# Patient Record
Sex: MALE | Race: WHITE | Employment: FULL TIME | URBAN - METROPOLITAN AREA
[De-identification: names, ages, dates, MRNs, and addresses within clinical notes are randomized per-mention and may not be internally consistent; named-entity substitution may affect disease eponyms.]

---

## 2021-10-15 ENCOUNTER — APPOINTMENT (OUTPATIENT)
Dept: GENERAL RADIOLOGY | Facility: CLINIC | Age: 31
End: 2021-10-15
Attending: FAMILY MEDICINE
Payer: COMMERCIAL

## 2021-10-15 ENCOUNTER — HOSPITAL ENCOUNTER (EMERGENCY)
Facility: CLINIC | Age: 31
Discharge: HOME | End: 2021-10-15
Attending: FAMILY MEDICINE | Admitting: FAMILY MEDICINE
Payer: COMMERCIAL

## 2021-10-15 ENCOUNTER — NURSE TRIAGE (OUTPATIENT)
Dept: NURSING | Facility: CLINIC | Age: 31
End: 2021-10-15

## 2021-10-15 VITALS
RESPIRATION RATE: 24 BRPM | WEIGHT: 250 LBS | OXYGEN SATURATION: 93 % | HEART RATE: 96 BPM | HEIGHT: 71 IN | SYSTOLIC BLOOD PRESSURE: 140 MMHG | TEMPERATURE: 100.2 F | DIASTOLIC BLOOD PRESSURE: 73 MMHG | BODY MASS INDEX: 35 KG/M2

## 2021-10-15 DIAGNOSIS — U07.1 INFECTION DUE TO 2019 NOVEL CORONAVIRUS: ICD-10-CM

## 2021-10-15 LAB
ALBUMIN SERPL-MCNC: 3.6 G/DL (ref 3.4–5)
ALP SERPL-CCNC: 56 U/L (ref 40–150)
ALT SERPL W P-5'-P-CCNC: 64 U/L (ref 0–70)
ANION GAP SERPL CALCULATED.3IONS-SCNC: 7 MMOL/L (ref 3–14)
AST SERPL W P-5'-P-CCNC: 34 U/L (ref 0–45)
BASOPHILS # BLD AUTO: 0 10E3/UL (ref 0–0.2)
BASOPHILS NFR BLD AUTO: 0 %
BILIRUB SERPL-MCNC: 0.3 MG/DL (ref 0.2–1.3)
BUN SERPL-MCNC: 13 MG/DL (ref 7–30)
CALCIUM SERPL-MCNC: 8.3 MG/DL (ref 8.5–10.1)
CHLORIDE BLD-SCNC: 103 MMOL/L (ref 94–109)
CO2 SERPL-SCNC: 23 MMOL/L (ref 20–32)
CREAT SERPL-MCNC: 0.91 MG/DL (ref 0.66–1.25)
D DIMER PPP FEU-MCNC: <0.27 UG/ML FEU (ref 0–0.5)
EOSINOPHIL # BLD AUTO: 0 10E3/UL (ref 0–0.7)
EOSINOPHIL NFR BLD AUTO: 0 %
ERYTHROCYTE [DISTWIDTH] IN BLOOD BY AUTOMATED COUNT: 11.9 % (ref 10–15)
GFR SERPL CREATININE-BSD FRML MDRD: >90 ML/MIN/1.73M2
GLUCOSE BLD-MCNC: 104 MG/DL (ref 70–99)
HCT VFR BLD AUTO: 46.5 % (ref 40–53)
HGB BLD-MCNC: 15.8 G/DL (ref 13.3–17.7)
HOLD SPECIMEN: NORMAL
HOLD SPECIMEN: NORMAL
IMM GRANULOCYTES # BLD: 0 10E3/UL
IMM GRANULOCYTES NFR BLD: 0 %
LYMPHOCYTES # BLD AUTO: 0.8 10E3/UL (ref 0.8–5.3)
LYMPHOCYTES NFR BLD AUTO: 21 %
MCH RBC QN AUTO: 30.2 PG (ref 26.5–33)
MCHC RBC AUTO-ENTMCNC: 34 G/DL (ref 31.5–36.5)
MCV RBC AUTO: 89 FL (ref 78–100)
MONOCYTES # BLD AUTO: 0.2 10E3/UL (ref 0–1.3)
MONOCYTES NFR BLD AUTO: 6 %
NEUTROPHILS # BLD AUTO: 2.8 10E3/UL (ref 1.6–8.3)
NEUTROPHILS NFR BLD AUTO: 73 %
NRBC # BLD AUTO: 0 10E3/UL
NRBC BLD AUTO-RTO: 0 /100
PLATELET # BLD AUTO: 151 10E3/UL (ref 150–450)
POTASSIUM BLD-SCNC: 4.3 MMOL/L (ref 3.4–5.3)
PROT SERPL-MCNC: 8 G/DL (ref 6.8–8.8)
RBC # BLD AUTO: 5.24 10E6/UL (ref 4.4–5.9)
SODIUM SERPL-SCNC: 133 MMOL/L (ref 133–144)
TROPONIN I SERPL-MCNC: <0.015 UG/L (ref 0–0.04)
WBC # BLD AUTO: 3.9 10E3/UL (ref 4–11)

## 2021-10-15 PROCEDURE — 36415 COLL VENOUS BLD VENIPUNCTURE: CPT | Performed by: FAMILY MEDICINE

## 2021-10-15 PROCEDURE — 99285 EMERGENCY DEPT VISIT HI MDM: CPT | Mod: 25 | Performed by: FAMILY MEDICINE

## 2021-10-15 PROCEDURE — 71045 X-RAY EXAM CHEST 1 VIEW: CPT

## 2021-10-15 PROCEDURE — 258N000003 HC RX IP 258 OP 636: Performed by: FAMILY MEDICINE

## 2021-10-15 PROCEDURE — 84484 ASSAY OF TROPONIN QUANT: CPT | Performed by: FAMILY MEDICINE

## 2021-10-15 PROCEDURE — 96361 HYDRATE IV INFUSION ADD-ON: CPT | Performed by: FAMILY MEDICINE

## 2021-10-15 PROCEDURE — 250N000011 HC RX IP 250 OP 636: Performed by: FAMILY MEDICINE

## 2021-10-15 PROCEDURE — 93005 ELECTROCARDIOGRAM TRACING: CPT | Performed by: FAMILY MEDICINE

## 2021-10-15 PROCEDURE — 85379 FIBRIN DEGRADATION QUANT: CPT | Performed by: FAMILY MEDICINE

## 2021-10-15 PROCEDURE — 93010 ELECTROCARDIOGRAM REPORT: CPT | Performed by: FAMILY MEDICINE

## 2021-10-15 PROCEDURE — 82040 ASSAY OF SERUM ALBUMIN: CPT | Performed by: FAMILY MEDICINE

## 2021-10-15 PROCEDURE — 96374 THER/PROPH/DIAG INJ IV PUSH: CPT | Performed by: FAMILY MEDICINE

## 2021-10-15 PROCEDURE — 85025 COMPLETE CBC W/AUTO DIFF WBC: CPT | Performed by: FAMILY MEDICINE

## 2021-10-15 RX ORDER — KETOROLAC TROMETHAMINE 15 MG/ML
15 INJECTION, SOLUTION INTRAMUSCULAR; INTRAVENOUS ONCE
Status: COMPLETED | OUTPATIENT
Start: 2021-10-15 | End: 2021-10-15

## 2021-10-15 RX ADMIN — KETOROLAC TROMETHAMINE 15 MG: 15 INJECTION, SOLUTION INTRAMUSCULAR; INTRAVENOUS at 16:05

## 2021-10-15 RX ADMIN — SODIUM CHLORIDE, POTASSIUM CHLORIDE, SODIUM LACTATE AND CALCIUM CHLORIDE 1000 ML: 600; 310; 30; 20 INJECTION, SOLUTION INTRAVENOUS at 16:05

## 2021-10-15 ASSESSMENT — MIFFLIN-ST. JEOR: SCORE: 2111.12

## 2021-10-15 NOTE — DISCHARGE INSTRUCTIONS
You may use ibuprofen 400 to 600 mg 4 times per day if needed for her discomfort.  You may add acetaminophen 1000 mg 4 times per day if needed.  Monitor your oxygenation and return to be seen if your O2 sats are consistently below 90%.,  And also if you are not able to manage pain or not able to take fluids.  If you are not taking food, you need to drink more than just water.  Sport drinks and chicken broth will help.  See additional discharge instructions attached.

## 2021-10-15 NOTE — TELEPHONE ENCOUNTER
RN triage   Call from pt   Pt states he had positive covid test 10/11--   Fevers initially 104 x 3 days --- now vary == 99 - 102--- 102 today   Cough -- and dif breathing -- even at rest --   Nausea and vomiting -- and vomiting w/ coughing -- x1 today   Not sleeping   Feeling very fatigued     Per protocol = should go to ED   Pt states he will go to [place] [facility]     Lucy Walker RN  [facility]  Triage Nurse Advisor    COVID 19 Nurse Triage Plan/Patient Instructions    Please be aware that novel coronavirus (COVID-19) may be circulating in the community. If you develop symptoms such as fever, cough, or SOB or if you have concerns about the presence of another infection including coronavirus (COVID-19), please contact your health care provider or visit [email or web address].     Disposition/Instructions    ED Visit recommended. Follow protocol based instructions.     Bring Your Own Device:  Please also bring your smart device(s) (smart phones, tablets, laptops) and their charging cables for your personal use and to communicate with your care team during your visit.    Thank you for taking steps to prevent the spread of this virus.  o Limit your contact with others.  o Wear a simple mask to cover your cough.  o Wash your hands well and often.    Resources    [facility]: About COVID-19: [email or web address]    CDC: What to Do If You're Sick: www.cdc.gov/coronavirus/2019-ncov/about/steps-when-sick.html    CDC: Ending Home Isolation: www.cdc.gov/coronavirus/2019-ncov/hcp/disposition-in-home-patients.html     CDC: Caring for Someone: www.cdc.gov/coronavirus/2019-ncov/if-you-are-sick/care-for-someone.html     [facility]: Interim Guidance for Hospital Discharge to Home: www.health.[facility].[place]/diseases/coronavirus/hcp/hospdischarge.pdf    [facility] clinical trials (COVID-19 research studies): [email or web address]     Below are the COVID-19 hotlines at the [facility]  [facility] ([facility]). Interpreters are available.   o For health questions: Call 509-661-1409 or 1-484.547.3900 (7 a.m. to 7 p.m.)  o For questions about schools and childcare: Call 594-481-4076 or 1-924.304.3809 (7 a.m. to 7 p.m.)                     Reason for Disposition    MODERATE difficulty breathing (e.g., speaks in phrases, SOB even at rest, pulse 100-120)    Additional Information    Negative: SEVERE difficulty breathing (e.g., struggling for each breath, speaks in single words)    Negative: Difficult to awaken or acting confused (e.g., disoriented, slurred speech)    Negative: Bluish (or gray) lips or face now    Negative: Shock suspected (e.g., cold/pale/clammy skin, too weak to stand, low BP, rapid pulse)    Negative: Sounds like a life-threatening emergency to the triager    Negative: SEVERE or constant chest pain or pressure (Exception: mild central chest pain, present only when coughing)    Protocols used: CORONAVIRUS (COVID-19) DIAGNOSED OR RTOTCEKVN-A-AL 8.25.2021

## 2021-10-15 NOTE — ED TRIAGE NOTES
~6 days of not feeling well. Monday tested for + COVID. Fever (highest 104), cough, HA, body aches, generalized weakness, diarrhea (improved), N/V. Using OTC for sx control. Not COVID vaccinated.

## 2021-10-15 NOTE — ED NOTES
Discharge reviewed with pt, sent with oximeter and get well loop packet. Pt states feeling much better after IVF.

## 2021-10-15 NOTE — ED PROVIDER NOTES
"  History     Chief Complaint   Patient presents with     Covid Concern     Sx are not getting any better.      HPI    Les Herzog is a 31 year old male who is not vaccinated against Covid and developed Covid symptoms and took a test and was found to be positive on October 11, 4 days ago, a day or 2 after symptoms began.  Wife has similar illness.  He has cough, myalgias, malaise, fevers, occasional chest discomfort occasional abdominal discomfort, nausea, anorexia, lightheadedness, poor oral intake.  No sustained focal significant abdominal or chest pain.  He does not feel short of breath at rest but feels comfort with deep inspiration.  He has no identified chronic medical problems.  He takes no prescription medications.  He does not smoke.  He has no history of asthma or other lung disease.  He has been taking Tylenol, ibuprofen, NyQuil, TheraFlu.    Allergies:  No Known Allergies    Problem List:    There are no problems to display for this patient.       Past Medical History:    No past medical history on file.    Past Surgical History:    No past surgical history on file.    Family History:    No family history on file.    Social History:  Marital Status:   [2]  Social History     Tobacco Use     Smoking status: Not on file   Substance Use Topics     Alcohol use: Not on file     Drug use: Not on file        Medications:    No current outpatient medications on file.        Review of Systems    All other systems are reviewed and are negative    Physical Exam   BP: (!) 167/96  Pulse: 92  Temp: (!) 102.1  F (38.9  C)  Resp: 24  Height: 180.3 cm (5' 11\")  Weight: 113.4 kg (250 lb)  SpO2: 98 %      Physical Exam    Nursing note and vitals were reviewed.  Constitutional: Awake and alert, overweight appearing 31-year-old in no apparent discomfort, who peers moderately ill but nontoxic and who answers questions appropriately and cooperates with examination.  HEENT: EOMI.   Neck: Freely " mobile.  Cardiovascular: Cardiac examination reveals normal heart rate and regular rhythm without murmur.  Pulmonary/Chest: Breathing is unlabored.  Breath sounds are clear and equal bilaterally.  There no retractions, tachypnea, rales, wheezes, or rhonchi.  Abdomen: Soft, nontender, no HSM or masses rebound or guarding.  Musculoskeletal: Extremities are warm and well-perfused and without edema  Neurological: Alert, oriented, thought content logical, coherent   Skin: Warm, dry, no rashes.  Psychiatric: Affect congruent with acute illness.      ED Course        Procedures              EKG Interpretation:      Interpreted by Cesar Yoder MD  Time reviewed: 16:28  Symptoms at time of EKG: chest pain   Rhythm: normal sinus   Rate: normal  Axis: normal  Ectopy: none  Conduction: normal  ST Segments/ T Waves: No ST-T wave changes  Q Waves: none  Comparison to prior: No old EKG available    Clinical Impression: normal EKG          Critical Care time:  none               Results for orders placed or performed during the hospital encounter of 10/15/21 (from the past 24 hour(s))   CBC with platelets differential    Narrative    The following orders were created for panel order CBC with platelets differential.  Procedure                               Abnormality         Status                     ---------                               -----------         ------                     CBC with platelets and d...[[id number]]  Abnormal            Final result                 Please view results for these tests on the individual orders.   Comprehensive metabolic panel   Result Value Ref Range    Sodium 133 133 - 144 mmol/L    Potassium 4.3 3.4 - 5.3 mmol/L    Chloride 103 94 - 109 mmol/L    Carbon Dioxide (CO2) 23 20 - 32 mmol/L    Anion Gap 7 3 - 14 mmol/L    Urea Nitrogen 13 7 - 30 mg/dL    Creatinine 0.91 0.66 - 1.25 mg/dL    Calcium 8.3 (L) 8.5 - 10.1 mg/dL    Glucose 104 (H) 70 - 99 mg/dL    Alkaline Phosphatase 56 40 - 150  U/L    AST 34 0 - 45 U/L    ALT 64 0 - 70 U/L    Protein Total 8.0 6.8 - 8.8 g/dL    Albumin 3.6 3.4 - 5.0 g/dL    Bilirubin Total 0.3 0.2 - 1.3 mg/dL    GFR Estimate >90 >60 mL/min/1.73m2   D dimer quantitative   Result Value Ref Range    D-Dimer Quantitative <0.27 0.00 - 0.50 ug/mL FEU    Narrative    This D-dimer assay is intended for use in conjunction with a clinical pretest probability assessment model to exclude pulmonary embolism (PE) and deep venous thrombosis (DVT) in outpatients suspected of PE or DVT. The cut-off value is 0.50 ug/mL FEU.   Troponin I   Result Value Ref Range    Troponin I <0.015 0.000 - 0.045 ug/L   CBC with platelets and differential   Result Value Ref Range    WBC Count 3.9 (L) 4.0 - 11.0 10e3/uL    RBC Count 5.24 4.40 - 5.90 10e6/uL    Hemoglobin 15.8 13.3 - 17.7 g/dL    Hematocrit 46.5 40.0 - 53.0 %    MCV 89 78 - 100 fL    MCH 30.2 26.5 - 33.0 pg    MCHC 34.0 31.5 - 36.5 g/dL    RDW 11.9 10.0 - 15.0 %    Platelet Count 151 150 - 450 10e3/uL    % Neutrophils 73 %    % Lymphocytes 21 %    % Monocytes 6 %    % Eosinophils 0 %    % Basophils 0 %    % Immature Granulocytes 0 %    NRBCs per 100 WBC 0 <1 /100    Absolute Neutrophils 2.8 1.6 - 8.3 10e3/uL    Absolute Lymphocytes 0.8 0.8 - 5.3 10e3/uL    Absolute Monocytes 0.2 0.0 - 1.3 10e3/uL    Absolute Eosinophils 0.0 0.0 - 0.7 10e3/uL    Absolute Basophils 0.0 0.0 - 0.2 10e3/uL    Absolute Immature Granulocytes 0.0 <=0.0 10e3/uL    Absolute NRBCs 0.0 10e3/uL   [place] Draw    Narrative    The following orders were created for panel order [place] Draw.  Procedure                               Abnormality         Status                     ---------                               -----------         ------                     Extra Blue Top Tube[[id number]]                                                         Extra Red Top Tube[[id number]]                               Final result               Extra Green Top (Lithium...[[id number]]                       Final result                 Please view results for these tests on the individual orders.   Extra Red Top Tube   Result Value Ref Range    Hold Specimen JIC    Extra Green Top (Lithium Heparin) ON ICE   Result Value Ref Range    Hold Specimen JIC        Medications   lactated ringers BOLUS 1,000 mL (1,000 mLs Intravenous New Bag 10/15/21 1605)   ketorolac (TORADOL) injection 15 mg (15 mg Intravenous Given 10/15/21 1605)       Assessments & Plan (with Medical Decision Making)     31-year-old male previously healthy except for overweight unvaccinated against COVID-19 presents with typical COVID-19 symptoms present for 4 days with a positive Covid test.  Work-up to look for evidence of complications is all reassuring with reassuring blood tests and normal EKG and chest x-ray.  I reviewed the chest x-ray independently as well as the radiologist.  At this time he does not require hospitalization and there is no specific therapy.  Symptomatic care was outlined.  He has been referred to the get well [place] and sent home with an oxygen monitor.  We discussed signs and symptoms that should prompt return and gave him written information about this.  He is not a candidate for monoclonal antibody therapy.  He felt significantly improved after Toradol and IV fluid hydration.  He expressed understanding and his questions were answered.    I have reviewed the nursing notes.    I have reviewed the findings, diagnosis, plan and need for follow up with the patient.       New Prescriptions    No medications on file       Final diagnoses:   Infection due to 2019 novel coronavirus       10/15/2021   [facility] EMERGENCY DEPT     Cesar Yoder MD  10/15/21 2722

## 2021-10-16 ENCOUNTER — PATIENT OUTREACH (OUTPATIENT)
Dept: CARE COORDINATION | Facility: CLINIC | Age: 31
End: 2021-10-16

## 2021-10-16 NOTE — TELEPHONE ENCOUNTER
Clinic Care Coordination Contact  [facility]/Voicemail    Clinical Data: Care Coordinator Outreach  Outreach attempted x 1.  Left message on patient's voicemail  Plan: Care Coordinator will try to reach patient again in 1-2 business days.    Shoshana Ewing RN  [facility] Care Resource [facility]

## 2021-10-17 ENCOUNTER — APPOINTMENT (OUTPATIENT)
Dept: CT IMAGING | Facility: CLINIC | Age: 31
End: 2021-10-17
Attending: EMERGENCY MEDICINE
Payer: COMMERCIAL

## 2021-10-17 ENCOUNTER — HOSPITAL ENCOUNTER (EMERGENCY)
Facility: CLINIC | Age: 31
Discharge: HOME | End: 2021-10-17
Attending: EMERGENCY MEDICINE | Admitting: EMERGENCY MEDICINE
Payer: COMMERCIAL

## 2021-10-17 VITALS
RESPIRATION RATE: 18 BRPM | DIASTOLIC BLOOD PRESSURE: 74 MMHG | HEART RATE: 104 BPM | SYSTOLIC BLOOD PRESSURE: 126 MMHG | TEMPERATURE: 99.5 F | OXYGEN SATURATION: 95 %

## 2021-10-17 DIAGNOSIS — U07.1 PNEUMONIA DUE TO 2019 NOVEL CORONAVIRUS: ICD-10-CM

## 2021-10-17 DIAGNOSIS — J12.82 PNEUMONIA DUE TO 2019 NOVEL CORONAVIRUS: ICD-10-CM

## 2021-10-17 LAB
ALBUMIN SERPL-MCNC: 3.2 G/DL (ref 3.4–5)
ALP SERPL-CCNC: 45 U/L (ref 40–150)
ALT SERPL W P-5'-P-CCNC: 52 U/L (ref 0–70)
ANION GAP SERPL CALCULATED.3IONS-SCNC: 6 MMOL/L (ref 3–14)
AST SERPL W P-5'-P-CCNC: 33 U/L (ref 0–45)
BASE EXCESS BLDV CALC-SCNC: 1.8 MMOL/L (ref -7.7–1.9)
BASOPHILS # BLD AUTO: 0 10E3/UL (ref 0–0.2)
BASOPHILS NFR BLD AUTO: 0 %
BILIRUB SERPL-MCNC: 0.3 MG/DL (ref 0.2–1.3)
BUN SERPL-MCNC: 8 MG/DL (ref 7–30)
CALCIUM SERPL-MCNC: 8.2 MG/DL (ref 8.5–10.1)
CHLORIDE BLD-SCNC: 106 MMOL/L (ref 94–109)
CO2 SERPL-SCNC: 24 MMOL/L (ref 20–32)
CREAT SERPL-MCNC: 0.8 MG/DL (ref 0.66–1.25)
CRP SERPL-MCNC: 43 MG/L (ref 0–8)
D DIMER PPP FEU-MCNC: 0.35 UG/ML FEU (ref 0–0.5)
EOSINOPHIL # BLD AUTO: 0 10E3/UL (ref 0–0.7)
EOSINOPHIL NFR BLD AUTO: 0 %
ERYTHROCYTE [DISTWIDTH] IN BLOOD BY AUTOMATED COUNT: 12.1 % (ref 10–15)
ERYTHROCYTE [SEDIMENTATION RATE] IN BLOOD BY WESTERGREN METHOD: 18 MM/HR (ref 0–15)
GFR SERPL CREATININE-BSD FRML MDRD: >90 ML/MIN/1.73M2
GLUCOSE BLD-MCNC: 115 MG/DL (ref 70–99)
HCO3 BLDV-SCNC: 27 MMOL/L (ref 21–28)
HCT VFR BLD AUTO: 40.6 % (ref 40–53)
HGB BLD-MCNC: 14 G/DL (ref 13.3–17.7)
IMM GRANULOCYTES # BLD: 0 10E3/UL
IMM GRANULOCYTES NFR BLD: 0 %
LYMPHOCYTES # BLD AUTO: 0.4 10E3/UL (ref 0.8–5.3)
LYMPHOCYTES NFR BLD AUTO: 13 %
MCH RBC QN AUTO: 30.8 PG (ref 26.5–33)
MCHC RBC AUTO-ENTMCNC: 34.5 G/DL (ref 31.5–36.5)
MCV RBC AUTO: 89 FL (ref 78–100)
MONOCYTES # BLD AUTO: 0.1 10E3/UL (ref 0–1.3)
MONOCYTES NFR BLD AUTO: 3 %
NEUTROPHILS # BLD AUTO: 2.6 10E3/UL (ref 1.6–8.3)
NEUTROPHILS NFR BLD AUTO: 84 %
NRBC # BLD AUTO: 0 10E3/UL
NRBC BLD AUTO-RTO: 0 /100
O2/TOTAL GAS SETTING VFR VENT: 0 %
PCO2 BLDV: 45 MM HG (ref 40–50)
PH BLDV: 7.39 [PH] (ref 7.32–7.43)
PLATELET # BLD AUTO: 105 10E3/UL (ref 150–450)
PO2 BLDV: 23 MM HG (ref 25–47)
POTASSIUM BLD-SCNC: 4.3 MMOL/L (ref 3.4–5.3)
PROT SERPL-MCNC: 7.2 G/DL (ref 6.8–8.8)
RBC # BLD AUTO: 4.55 10E6/UL (ref 4.4–5.9)
SODIUM SERPL-SCNC: 136 MMOL/L (ref 133–144)
TROPONIN I SERPL-MCNC: <0.015 UG/L (ref 0–0.04)
WBC # BLD AUTO: 3.1 10E3/UL (ref 4–11)

## 2021-10-17 PROCEDURE — 36415 COLL VENOUS BLD VENIPUNCTURE: CPT | Performed by: EMERGENCY MEDICINE

## 2021-10-17 PROCEDURE — 82040 ASSAY OF SERUM ALBUMIN: CPT | Performed by: EMERGENCY MEDICINE

## 2021-10-17 PROCEDURE — 99285 EMERGENCY DEPT VISIT HI MDM: CPT | Mod: 25 | Performed by: EMERGENCY MEDICINE

## 2021-10-17 PROCEDURE — 71275 CT ANGIOGRAPHY CHEST: CPT

## 2021-10-17 PROCEDURE — 85652 RBC SED RATE AUTOMATED: CPT | Performed by: EMERGENCY MEDICINE

## 2021-10-17 PROCEDURE — 250N000011 HC RX IP 250 OP 636: Performed by: EMERGENCY MEDICINE

## 2021-10-17 PROCEDURE — 250N000009 HC RX 250: Performed by: EMERGENCY MEDICINE

## 2021-10-17 PROCEDURE — 82803 BLOOD GASES ANY COMBINATION: CPT | Performed by: EMERGENCY MEDICINE

## 2021-10-17 PROCEDURE — 250N000013 HC RX MED GY IP 250 OP 250 PS 637: Performed by: EMERGENCY MEDICINE

## 2021-10-17 PROCEDURE — 96361 HYDRATE IV INFUSION ADD-ON: CPT | Performed by: EMERGENCY MEDICINE

## 2021-10-17 PROCEDURE — 93005 ELECTROCARDIOGRAM TRACING: CPT | Performed by: EMERGENCY MEDICINE

## 2021-10-17 PROCEDURE — 94640 AIRWAY INHALATION TREATMENT: CPT | Performed by: EMERGENCY MEDICINE

## 2021-10-17 PROCEDURE — 258N000003 HC RX IP 258 OP 636: Performed by: EMERGENCY MEDICINE

## 2021-10-17 PROCEDURE — 84484 ASSAY OF TROPONIN QUANT: CPT | Performed by: EMERGENCY MEDICINE

## 2021-10-17 PROCEDURE — 96375 TX/PRO/DX INJ NEW DRUG ADDON: CPT | Performed by: EMERGENCY MEDICINE

## 2021-10-17 PROCEDURE — 93010 ELECTROCARDIOGRAM REPORT: CPT | Performed by: EMERGENCY MEDICINE

## 2021-10-17 PROCEDURE — 85025 COMPLETE CBC W/AUTO DIFF WBC: CPT | Performed by: EMERGENCY MEDICINE

## 2021-10-17 PROCEDURE — 86140 C-REACTIVE PROTEIN: CPT | Performed by: EMERGENCY MEDICINE

## 2021-10-17 PROCEDURE — 85379 FIBRIN DEGRADATION QUANT: CPT | Performed by: EMERGENCY MEDICINE

## 2021-10-17 PROCEDURE — 96365 THER/PROPH/DIAG IV INF INIT: CPT | Mod: 59 | Performed by: EMERGENCY MEDICINE

## 2021-10-17 RX ORDER — DEXAMETHASONE SODIUM PHOSPHATE 10 MG/ML
10 INJECTION, SOLUTION INTRAMUSCULAR; INTRAVENOUS ONCE
Status: COMPLETED | OUTPATIENT
Start: 2021-10-17 | End: 2021-10-17

## 2021-10-17 RX ORDER — CEFTRIAXONE 1 G/1
1 INJECTION, POWDER, FOR SOLUTION INTRAMUSCULAR; INTRAVENOUS ONCE
Status: COMPLETED | OUTPATIENT
Start: 2021-10-17 | End: 2021-10-17

## 2021-10-17 RX ORDER — PREDNISONE 20 MG/1
TABLET ORAL
Qty: 10 TABLET | Refills: 0 | Status: SHIPPED | OUTPATIENT
Start: 2021-10-17

## 2021-10-17 RX ORDER — PROMETHAZINE HYDROCHLORIDE AND CODEINE PHOSPHATE 6.25; 1 MG/5ML; MG/5ML
5 SYRUP ORAL EVERY 4 HOURS PRN
Status: DISCONTINUED | OUTPATIENT
Start: 2021-10-17 | End: 2021-10-17 | Stop reason: ALTCHOICE

## 2021-10-17 RX ORDER — ONDANSETRON 4 MG/1
4 TABLET, ORALLY DISINTEGRATING ORAL EVERY 8 HOURS PRN
Qty: 10 TABLET | Refills: 0 | Status: SHIPPED | OUTPATIENT
Start: 2021-10-17 | End: 2021-10-20

## 2021-10-17 RX ORDER — AZITHROMYCIN 250 MG/1
TABLET, FILM COATED ORAL
Qty: 6 TABLET | Refills: 0 | Status: SHIPPED | OUTPATIENT
Start: 2021-10-17 | End: 2021-10-22

## 2021-10-17 RX ORDER — AZITHROMYCIN 250 MG/1
500 TABLET, FILM COATED ORAL ONCE
Status: COMPLETED | OUTPATIENT
Start: 2021-10-17 | End: 2021-10-17

## 2021-10-17 RX ORDER — IOPAMIDOL 755 MG/ML
86 INJECTION, SOLUTION INTRAVASCULAR ONCE
Status: COMPLETED | OUTPATIENT
Start: 2021-10-17 | End: 2021-10-17

## 2021-10-17 RX ORDER — KETOROLAC TROMETHAMINE 15 MG/ML
15 INJECTION, SOLUTION INTRAMUSCULAR; INTRAVENOUS ONCE
Status: COMPLETED | OUTPATIENT
Start: 2021-10-17 | End: 2021-10-17

## 2021-10-17 RX ORDER — ONDANSETRON 2 MG/ML
4 INJECTION INTRAMUSCULAR; INTRAVENOUS ONCE
Status: COMPLETED | OUTPATIENT
Start: 2021-10-17 | End: 2021-10-17

## 2021-10-17 RX ORDER — PROMETHAZINE HYDROCHLORIDE AND CODEINE PHOSPHATE 6.25; 1 MG/5ML; MG/5ML
5 SYRUP ORAL 4 TIMES DAILY PRN
Qty: 120 ML | Refills: 0 | Status: SHIPPED | OUTPATIENT
Start: 2021-10-17

## 2021-10-17 RX ORDER — ALBUTEROL SULFATE 90 UG/1
2 AEROSOL, METERED RESPIRATORY (INHALATION) EVERY 6 HOURS PRN
Status: DISCONTINUED | OUTPATIENT
Start: 2021-10-17 | End: 2021-10-17 | Stop reason: HOSPADM

## 2021-10-17 RX ORDER — CODEINE PHOSPHATE AND GUAIFENESIN 10; 100 MG/5ML; MG/5ML
5 SOLUTION ORAL EVERY 4 HOURS PRN
Status: DISCONTINUED | OUTPATIENT
Start: 2021-10-17 | End: 2021-10-17 | Stop reason: HOSPADM

## 2021-10-17 RX ADMIN — ACETAMINOPHEN AND CODEINE PHOSPHATE 2 TABLET: 300; 30 TABLET ORAL at 13:54

## 2021-10-17 RX ADMIN — IOPAMIDOL 86 ML: 755 INJECTION, SOLUTION INTRAVENOUS at 12:49

## 2021-10-17 RX ADMIN — SODIUM CHLORIDE 100 ML: 9 INJECTION, SOLUTION INTRAVENOUS at 12:50

## 2021-10-17 RX ADMIN — IBUPROFEN 600 MG: 200 TABLET, FILM COATED ORAL at 13:54

## 2021-10-17 RX ADMIN — KETOROLAC TROMETHAMINE 15 MG: 15 INJECTION, SOLUTION INTRAMUSCULAR; INTRAVENOUS at 11:04

## 2021-10-17 RX ADMIN — SODIUM CHLORIDE 500 ML: 9 INJECTION, SOLUTION INTRAVENOUS at 11:04

## 2021-10-17 RX ADMIN — CEFTRIAXONE 1 G: 1 INJECTION, POWDER, FOR SOLUTION INTRAMUSCULAR; INTRAVENOUS at 14:45

## 2021-10-17 RX ADMIN — DEXAMETHASONE SODIUM PHOSPHATE 10 MG: 10 INJECTION, SOLUTION INTRAMUSCULAR; INTRAVENOUS at 15:24

## 2021-10-17 RX ADMIN — ALBUTEROL SULFATE 2 PUFF: 90 AEROSOL, METERED RESPIRATORY (INHALATION) at 12:59

## 2021-10-17 RX ADMIN — GUAIFENESIN AND CODEINE PHOSPHATE 5 ML: 100; 10 SOLUTION ORAL at 15:24

## 2021-10-17 RX ADMIN — ONDANSETRON 4 MG: 2 INJECTION INTRAMUSCULAR; INTRAVENOUS at 14:45

## 2021-10-17 RX ADMIN — AZITHROMYCIN MONOHYDRATE 500 MG: 250 TABLET ORAL at 14:44

## 2021-10-17 ASSESSMENT — ENCOUNTER SYMPTOMS
NUMBNESS: 0
LIGHT-HEADEDNESS: 0
DYSURIA: 0
COUGH: 1
MYALGIAS: 1
CONFUSION: 0
AGITATION: 0
NAUSEA: 0
SHORTNESS OF BREATH: 1
ABDOMINAL PAIN: 0
FEVER: 0
CHILLS: 0
BLOOD IN STOOL: 0
PALPITATIONS: 0
FREQUENCY: 0
DIARRHEA: 0
SEIZURES: 0
NERVOUS/ANXIOUS: 0
EYE REDNESS: 0
WEAKNESS: 0
FLANK PAIN: 0
CHEST TIGHTNESS: 0
BACK PAIN: 0
HEADACHES: 0
VOMITING: 0
EYE DISCHARGE: 0
SORE THROAT: 0
NECK STIFFNESS: 0

## 2021-10-17 NOTE — ED NOTES
Pt ambulatory at bedside o2 sats drop to 88-89%, improved with rest. Pt feels like his chest tightness has improved as well after a coughing episode

## 2021-10-17 NOTE — ED PROVIDER NOTES
History     Chief Complaint   Patient presents with     Covid Concern     HPI  Les Herzog is a 31 year old male who had a positive Covid test performed 6 days ago.  He was seen 2 days ago in the emergency department with nausea, myalgias, fever, and headache.  He was given Toradol and fluids with improvement.  He was discharged with a pulse oximeter.  He reports that he has noticed some oxygen saturations at rest in the high 80s.  With activity it goes to 96.  He has been taking Tylenol and Motrin with improvement of his headache and body aches.  His cough has been worsening and is keeping him awake at night.  He is also complaining of increased shortness of breath.  He reports chest pain.  His nausea has improved.    Allergies:  No Known Allergies    Problem List:    There are no problems to display for this patient.       Past Medical History:    No past medical history on file.    Past Surgical History:    No past surgical history on file.    Family History:    No family history on file.    Social History:  Marital Status:   [2]  Social History     Tobacco Use     Smoking status: Not on file   Substance Use Topics     Alcohol use: Not on file     Drug use: Not on file        Medications:    amoxicillin-clavulanate (AUGMENTIN) 875-125 MG tablet  azithromycin (ZITHROMAX Z-EDER) 250 MG tablet  ondansetron (ZOFRAN ODT) 4 MG ODT tab  predniSONE (DELTASONE) 20 MG tablet  promethazine-codeine (PHENERGAN WITH CODEINE) 6.25-10 MG/5ML syrup          Review of Systems   Constitutional: Negative for chills and fever.   HENT: Negative for sore throat.    Eyes: Negative for discharge and redness.   Respiratory: Positive for cough and shortness of breath. Negative for chest tightness.    Cardiovascular: Positive for chest pain. Negative for palpitations and leg swelling.   Gastrointestinal: Negative for abdominal pain, blood in stool, diarrhea, nausea and vomiting.   Genitourinary: Negative for dysuria, flank pain  and frequency.   Musculoskeletal: Positive for myalgias. Negative for back pain and neck stiffness.   Skin: Negative for pallor.   Neurological: Negative for seizures, weakness, light-headedness, numbness and headaches.   Psychiatric/Behavioral: Negative for agitation and confusion. The patient is not nervous/anxious.        Physical Exam   BP: (!) 147/77  Pulse: 101  Temp: 99.5  F (37.5  C)  Resp: 18  SpO2: 95 %      Physical Exam  Vitals and nursing note reviewed.   Constitutional:       General: He is not in acute distress.     Appearance: He is well-developed. He is not diaphoretic.   HENT:      Head: Normocephalic and atraumatic.   Eyes:      General: No scleral icterus.     Pupils: Pupils are equal, round, and reactive to light.   Cardiovascular:      Rate and Rhythm: Normal rate and regular rhythm.      Heart sounds: Normal heart sounds. No murmur heard.     Pulmonary:      Effort: No respiratory distress.      Breath sounds: No stridor. Examination of the right-lower field reveals rhonchi. Examination of the left-lower field reveals rhonchi. Rhonchi present. No wheezing or rales.   Abdominal:      General: Bowel sounds are normal.      Palpations: Abdomen is soft.      Tenderness: There is no abdominal tenderness.   Musculoskeletal:         General: No tenderness.      Cervical back: Normal range of motion and neck supple.   Skin:     General: Skin is warm and dry.      Coloration: Skin is not pale.      Findings: No erythema or rash.   Neurological:      Mental Status: He is alert and oriented to person, place, and time.      Sensory: No sensory deficit.      Coordination: Coordination normal.         ED Course        Procedures         An EKG was performed.  It was read by me at 11:50 AM.  It shows a normal sinus rhythm with a rate of 91.  There are no acute ST-T segment abnormalities.           Results for orders placed or performed during the hospital encounter of 10/17/21 (from the past 24 hour(s))    Comprehensive metabolic panel   Result Value Ref Range    Sodium 136 133 - 144 mmol/L    Potassium 4.3 3.4 - 5.3 mmol/L    Chloride 106 94 - 109 mmol/L    Carbon Dioxide (CO2) 24 20 - 32 mmol/L    Anion Gap 6 3 - 14 mmol/L    Urea Nitrogen 8 7 - 30 mg/dL    Creatinine 0.80 0.66 - 1.25 mg/dL    Calcium 8.2 (L) 8.5 - 10.1 mg/dL    Glucose 115 (H) 70 - 99 mg/dL    Alkaline Phosphatase 45 40 - 150 U/L    AST 33 0 - 45 U/L    ALT 52 0 - 70 U/L    Protein Total 7.2 6.8 - 8.8 g/dL    Albumin 3.2 (L) 3.4 - 5.0 g/dL    Bilirubin Total 0.3 0.2 - 1.3 mg/dL    GFR Estimate >90 >60 mL/min/1.73m2   Blood gas venous   Result Value Ref Range    pH Venous 7.39 7.32 - 7.43    pCO2 Venous 45 40 - 50 mm Hg    pO2 Venous 23 (L) 25 - 47 mm Hg    Bicarbonate Venous 27 21 - 28 mmol/L    Base Excess/Deficit (+/-) 1.8 -7.7 - 1.9 mmol/L    FIO2 0    Troponin I   Result Value Ref Range    Troponin I <0.015 0.000 - 0.045 ug/L   D dimer quantitative   Result Value Ref Range    D-Dimer Quantitative 0.35 0.00 - 0.50 ug/mL FEU    Narrative    This D-dimer assay is intended for use in conjunction with a clinical pretest probability assessment model to exclude pulmonary embolism (PE) and deep venous thrombosis (DVT) in outpatients suspected of PE or DVT. The cut-off value is 0.50 ug/mL FEU.   CRP Inflammation   Result Value Ref Range    CRP Inflammation 43.0 (H) 0.0 - 8.0 mg/L   Erythrocyte sedimentation rate auto   Result Value Ref Range    Erythrocyte Sedimentation Rate 18 (H) 0 - 15 mm/hr   CBC with platelets and differential   Result Value Ref Range    WBC Count 3.1 (L) 4.0 - 11.0 10e3/uL    RBC Count 4.55 4.40 - 5.90 10e6/uL    Hemoglobin 14.0 13.3 - 17.7 g/dL    Hematocrit 40.6 40.0 - 53.0 %    MCV 89 78 - 100 fL    MCH 30.8 26.5 - 33.0 pg    MCHC 34.5 31.5 - 36.5 g/dL    RDW 12.1 10.0 - 15.0 %    Platelet Count 105 (L) 150 - 450 10e3/uL    % Neutrophils 84 %    % Lymphocytes 13 %    % Monocytes 3 %    % Eosinophils 0 %    % Basophils 0 %     % Immature Granulocytes 0 %    NRBCs per 100 WBC 0 <1 /100    Absolute Neutrophils 2.6 1.6 - 8.3 10e3/uL    Absolute Lymphocytes 0.4 (L) 0.8 - 5.3 10e3/uL    Absolute Monocytes 0.1 0.0 - 1.3 10e3/uL    Absolute Eosinophils 0.0 0.0 - 0.7 10e3/uL    Absolute Basophils 0.0 0.0 - 0.2 10e3/uL    Absolute Immature Granulocytes 0.0 <=0.0 10e3/uL    Absolute NRBCs 0.0 10e3/uL   CT Chest Pulmonary Embolism w Contrast    Narrative    EXAM: CT CHEST PULMONARY EMBOLISM WITH CONTRAST  LOCATION: [facility]  DATE/TIME: 10/17/2021, 12:46 PM    INDICATION: Hypoxia. Cough. COVID-19 pneumonia. Shortness of breath. Elevated d-dimer.  COMPARISON: None.  TECHNIQUE: CT chest pulmonary angiogram during arterial phase injection of IV contrast. Multiplanar reformats and MIP reconstructions were performed. Dose reduction techniques were used.   CONTRAST: 86 mL Isovue-370.    FINDINGS:  ANGIOGRAM CHEST: No evidence for pulmonary embolism. Thoracic aorta is negative for dissection. No CT evidence of right heart strain.    LUNGS AND PLEURA: No pleural effusions. Patchy groundglass opacity and consolidation throughout both lungs is consistent with COVID-19 pneumonia. No pneumothorax.    MEDIASTINUM/AXILLAE: Mild bilateral hilar adenopathy, slightly more prominent on the right. Scattered mildly prominent mediastinal and bilateral hilar lymph nodes do not meet size criteria for pathologic enlargement. No pericardial effusion.    CORONARY ARTERY CALCIFICATION: None.    UPPER ABDOMEN: Limited views of the upper abdomen are unremarkable.    MUSCULOSKELETAL: Unremarkable.      Impression    IMPRESSION:  1.  No evidence for pulmonary embolism.  2.  Patchy groundglass opacity and consolidation in both lungs, consistent with COVID-19 pneumonia.  3.  Mild bilateral hilar adenopathy.         Medications   albuterol (PROAIR HFA/PROVENTIL HFA/VENTOLIN HFA) 108 (90 Base) MCG/ACT inhaler 2 puff (2 puffs Inhalation Given 10/17/21  1259)   guaiFENesin-codeine (ROBITUSSIN AC) 100-10 MG/5ML solution 5 mL (5 mLs Oral Given 10/17/21 1524)   0.9% sodium chloride BOLUS (0 mLs Intravenous Stopped 10/17/21 1300)   ketorolac (TORADOL) injection 15 mg (15 mg Intravenous Given 10/17/21 1104)   iopamidol (ISOVUE-370) solution 86 mL (86 mLs Intravenous Given 10/17/21 1249)   sodium chloride 0.9 % bag 500mL for CT scan flush use (100 mLs Intravenous Given 10/17/21 1250)   acetaminophen-codeine (TYLENOL #3) 300-30 MG per tablet 2 tablet (2 tablets Oral Given 10/17/21 1354)   ibuprofen (ADVIL/MOTRIN) tablet 600 mg (600 mg Oral Given 10/17/21 1354)   cefTRIAXone (ROCEPHIN) 1 g vial to attach to  mL bag for ADULTS or NS 50 mL bag for PEDS (0 g Intravenous Stopped 10/17/21 1512)   azithromycin (ZITHROMAX) tablet 500 mg (500 mg Oral Given 10/17/21 1444)   ondansetron (ZOFRAN) injection 4 mg (4 mg Intravenous Given 10/17/21 1445)   dexamethasone PF (DECADRON) injection 10 mg (10 mg Intravenous Given 10/17/21 1524)       Assessments & Plan (with Medical Decision Making)     I have reviewed the nursing notes.    I have reviewed the findings, diagnosis, plan and need for follow up with the patient.  Patient is a 31-year-old who was diagnosed with Covid 6 days ago.  2 days ago he was seen with more myalgias, fever and headache.  He improved with Tylenol and ibuprofen.  He reports now he is having more shortness of breath.  He reports the shortness of breath is better with activity.  When he coughs it does not seem very productive and takes a lot out of him.  He feels like he needs a cough medication and antibiotics as this is helped him in the past when he has had bronchitis and pneumonia.    On arrival, patient's blood pressure is 147/77,  temperature 99.5, pulse 101, respiratory rate 18, and O2 saturations of 95%.  Patient has rhonchi on exam.  There is no obvious wheezing.    IV was established and bloods were drawn.    Patient was initially given IV fluids,  Toradol, and an albuterol inhaler without much improvement.  He actually now reported that he was feeling worse.    A venous blood gas showed a normal pH of 7.39 with a PCO2 of 45 and PO2 of 23.  His troponin was less than 0.015.  EKG showed no evidence of acute cardiac ischemia.  D-dimer returned at 0.35.  A comprehensive metabolic profile was unremarkable with exception of a slightly low calcium of 8.2    Patient's white count was 3.1 with a hemoglobin of 14.0.  CRP was 43.    CT scan of the chest showed no evidence of pulmonary embolism with patchy groundglass opacities and consolidation in both lungs consistent with COVID-19 pneumonia, and mild bilateral hilar adenopathy.    Patient then was given Robitussin with codeine Rocephin, Zithromax, dexamethasone, and Zofran.  He felt a great deal better.    We had him stand up and walk around his room a little bit without oxygen saturations less than 90%.  He basically needs symptomatic treatment at this point.  He will be treated with antibiotics, steroids, inhalers, Zofran, and pain medication.    He has a pulse oximeter at home and will return if he has persistent oxygen saturations of less than 90%.        Discharge Medication List as of 10/17/2021  4:23 PM      START taking these medications    Details   amoxicillin-clavulanate (AUGMENTIN) 875-125 MG tablet Take 1 tablet by mouth 2 times daily for 10 days, Disp-20 tablet, R-0, Local Print      azithromycin (ZITHROMAX Z-EDER) 250 MG tablet Two tablets on the first day, then one tablet daily for the next 4 days, Disp-6 tablet, R-0, Local Print      ondansetron (ZOFRAN ODT) 4 MG ODT tab Take 1 tablet (4 mg) by mouth every 8 hours as needed for nausea, Disp-10 tablet, R-0, Local Print      predniSONE (DELTASONE) 20 MG tablet Take two tablets (= 40mg) each day for 5 (five) days, Disp-10 tablet, R-0, Local Print      promethazine-codeine (PHENERGAN WITH CODEINE) 6.25-10 MG/5ML syrup Take 5 mLs by mouth 4 times daily as  needed for cough, Disp-120 mL, R-0, Local Print             Final diagnoses:   Pneumonia due to 2019 novel coronavirus       10/17/2021   [facility] EMERGENC[facility] DEPT     [facility]  10/17/21 [id number]

## 2021-10-17 NOTE — DISCHARGE INSTRUCTIONS
Use the albuterol inhaler 2 puffs every 4 hours as needed for cough and shortness of breath    Continue Tylenol and ibuprofen    Take the antibiotic as prescribed    Return if increasing shortness of breath or inability to eat and drink.

## 2021-10-17 NOTE — ED TRIAGE NOTES
Pt here with covid concern was diagnosed Monday, seen on Friday for dehydration. Today feels like breathing is shallow and reports low sats at rest on home o2 sensor.